# Patient Record
Sex: FEMALE | Race: WHITE | NOT HISPANIC OR LATINO | Employment: FULL TIME | RURAL
[De-identification: names, ages, dates, MRNs, and addresses within clinical notes are randomized per-mention and may not be internally consistent; named-entity substitution may affect disease eponyms.]

---

## 2020-11-20 ENCOUNTER — TELEPHONE (OUTPATIENT)
Dept: URGENT CARE | Facility: CLINIC | Age: 30
End: 2020-11-20

## 2020-11-21 ENCOUNTER — TELEPHONE (OUTPATIENT)
Dept: URGENT CARE | Facility: CLINIC | Age: 30
End: 2020-11-21

## 2020-11-21 NOTE — TELEPHONE ENCOUNTER
Spoke to pt directly concerning COVID positive results. Understands to continue quarantining and anyone in household needs to quarantine as well. Specific instructions given for each person/scenario. Informed pt local health department will likely call in the next few days. Advised to continue monitoring sx and maintain fluids. RTC or go to ER for new or worsening sx such as SOA, etc. Pt understands and all questions and concerns addressed at this time.